# Patient Record
Sex: MALE | Race: BLACK OR AFRICAN AMERICAN | NOT HISPANIC OR LATINO | URBAN - METROPOLITAN AREA
[De-identification: names, ages, dates, MRNs, and addresses within clinical notes are randomized per-mention and may not be internally consistent; named-entity substitution may affect disease eponyms.]

---

## 2017-04-17 ENCOUNTER — EMERGENCY (EMERGENCY)
Facility: HOSPITAL | Age: 42
LOS: 1 days | Discharge: HOME | End: 2017-04-17
Attending: EMERGENCY MEDICINE | Admitting: EMERGENCY MEDICINE
Payer: COMMERCIAL

## 2017-04-17 VITALS
RESPIRATION RATE: 16 BRPM | SYSTOLIC BLOOD PRESSURE: 130 MMHG | TEMPERATURE: 99 F | DIASTOLIC BLOOD PRESSURE: 80 MMHG | OXYGEN SATURATION: 99 % | HEART RATE: 97 BPM

## 2017-04-17 VITALS
DIASTOLIC BLOOD PRESSURE: 90 MMHG | HEART RATE: 83 BPM | RESPIRATION RATE: 16 BRPM | SYSTOLIC BLOOD PRESSURE: 145 MMHG | OXYGEN SATURATION: 98 % | TEMPERATURE: 99 F

## 2017-04-17 DIAGNOSIS — R10.32 LEFT LOWER QUADRANT PAIN: ICD-10-CM

## 2017-04-17 DIAGNOSIS — Z79.899 OTHER LONG TERM (CURRENT) DRUG THERAPY: ICD-10-CM

## 2017-04-17 DIAGNOSIS — F17.210 NICOTINE DEPENDENCE, CIGARETTES, UNCOMPLICATED: ICD-10-CM

## 2017-04-17 LAB
ALBUMIN SERPL ELPH-MCNC: 4.5 G/DL — SIGNIFICANT CHANGE UP (ref 3.3–5)
ALP SERPL-CCNC: 70 U/L — SIGNIFICANT CHANGE UP (ref 40–120)
ALT FLD-CCNC: 15 U/L RC — SIGNIFICANT CHANGE UP (ref 10–45)
ANION GAP SERPL CALC-SCNC: 17 MMOL/L — SIGNIFICANT CHANGE UP (ref 5–17)
APPEARANCE UR: CLEAR — SIGNIFICANT CHANGE UP
AST SERPL-CCNC: 20 U/L — SIGNIFICANT CHANGE UP (ref 10–40)
BASOPHILS # BLD AUTO: 0.1 K/UL — SIGNIFICANT CHANGE UP (ref 0–0.2)
BILIRUB SERPL-MCNC: 0.3 MG/DL — SIGNIFICANT CHANGE UP (ref 0.2–1.2)
BILIRUB UR-MCNC: NEGATIVE — SIGNIFICANT CHANGE UP
BUN SERPL-MCNC: 15 MG/DL — SIGNIFICANT CHANGE UP (ref 7–23)
CALCIUM SERPL-MCNC: 9.7 MG/DL — SIGNIFICANT CHANGE UP (ref 8.4–10.5)
CHLORIDE SERPL-SCNC: 102 MMOL/L — SIGNIFICANT CHANGE UP (ref 96–108)
CO2 SERPL-SCNC: 23 MMOL/L — SIGNIFICANT CHANGE UP (ref 22–31)
COLOR SPEC: SIGNIFICANT CHANGE UP
CREAT SERPL-MCNC: 1.3 MG/DL — SIGNIFICANT CHANGE UP (ref 0.5–1.3)
DIFF PNL FLD: NEGATIVE — SIGNIFICANT CHANGE UP
EOSINOPHIL # BLD AUTO: 0.2 K/UL — SIGNIFICANT CHANGE UP (ref 0–0.5)
EOSINOPHIL NFR BLD AUTO: 1 % — SIGNIFICANT CHANGE UP (ref 0–6)
GLUCOSE SERPL-MCNC: 110 MG/DL — HIGH (ref 70–99)
GLUCOSE UR QL: NEGATIVE — SIGNIFICANT CHANGE UP
HCT VFR BLD CALC: 45.8 % — SIGNIFICANT CHANGE UP (ref 39–50)
HGB BLD-MCNC: 15.9 G/DL — SIGNIFICANT CHANGE UP (ref 13–17)
KETONES UR-MCNC: NEGATIVE — SIGNIFICANT CHANGE UP
LEUKOCYTE ESTERASE UR-ACNC: NEGATIVE — SIGNIFICANT CHANGE UP
LYMPHOCYTES # BLD AUTO: 2.1 K/UL — SIGNIFICANT CHANGE UP (ref 1–3.3)
LYMPHOCYTES # BLD AUTO: 29 % — SIGNIFICANT CHANGE UP (ref 13–44)
MCHC RBC-ENTMCNC: 30.4 PG — SIGNIFICANT CHANGE UP (ref 27–34)
MCHC RBC-ENTMCNC: 34.7 GM/DL — SIGNIFICANT CHANGE UP (ref 32–36)
MCV RBC AUTO: 87.6 FL — SIGNIFICANT CHANGE UP (ref 80–100)
MONOCYTES # BLD AUTO: 0.8 K/UL — SIGNIFICANT CHANGE UP (ref 0–0.9)
MONOCYTES NFR BLD AUTO: 4 % — SIGNIFICANT CHANGE UP (ref 2–14)
NEUTROPHILS # BLD AUTO: 6.8 K/UL — SIGNIFICANT CHANGE UP (ref 1.8–7.4)
NEUTROPHILS NFR BLD AUTO: 66 % — SIGNIFICANT CHANGE UP (ref 43–77)
NITRITE UR-MCNC: NEGATIVE — SIGNIFICANT CHANGE UP
PH UR: 6.5 — SIGNIFICANT CHANGE UP (ref 4.8–8)
PLATELET # BLD AUTO: 211 K/UL — SIGNIFICANT CHANGE UP (ref 150–400)
POTASSIUM SERPL-MCNC: 3.9 MMOL/L — SIGNIFICANT CHANGE UP (ref 3.5–5.3)
POTASSIUM SERPL-SCNC: 3.9 MMOL/L — SIGNIFICANT CHANGE UP (ref 3.5–5.3)
PROT SERPL-MCNC: 7.6 G/DL — SIGNIFICANT CHANGE UP (ref 6–8.3)
PROT UR-MCNC: NEGATIVE — SIGNIFICANT CHANGE UP
RBC # BLD: 5.23 M/UL — SIGNIFICANT CHANGE UP (ref 4.2–5.8)
RBC # FLD: 13.1 % — SIGNIFICANT CHANGE UP (ref 10.3–14.5)
SODIUM SERPL-SCNC: 142 MMOL/L — SIGNIFICANT CHANGE UP (ref 135–145)
SP GR SPEC: 1.01 — SIGNIFICANT CHANGE UP (ref 1.01–1.02)
UROBILINOGEN FLD QL: NEGATIVE — SIGNIFICANT CHANGE UP
WBC # BLD: 9.9 K/UL — SIGNIFICANT CHANGE UP (ref 3.8–10.5)
WBC # FLD AUTO: 9.9 K/UL — SIGNIFICANT CHANGE UP (ref 3.8–10.5)

## 2017-04-17 PROCEDURE — 99284 EMERGENCY DEPT VISIT MOD MDM: CPT | Mod: 25

## 2017-04-17 PROCEDURE — 96374 THER/PROPH/DIAG INJ IV PUSH: CPT

## 2017-04-17 PROCEDURE — 85027 COMPLETE CBC AUTOMATED: CPT

## 2017-04-17 PROCEDURE — 99284 EMERGENCY DEPT VISIT MOD MDM: CPT

## 2017-04-17 PROCEDURE — 81003 URINALYSIS AUTO W/O SCOPE: CPT

## 2017-04-17 PROCEDURE — 80053 COMPREHEN METABOLIC PANEL: CPT

## 2017-04-17 RX ORDER — ACETAMINOPHEN 500 MG
975 TABLET ORAL ONCE
Qty: 0 | Refills: 0 | Status: DISCONTINUED | OUTPATIENT
Start: 2017-04-17 | End: 2017-04-17

## 2017-04-17 RX ORDER — KETOROLAC TROMETHAMINE 30 MG/ML
30 SYRINGE (ML) INJECTION ONCE
Qty: 0 | Refills: 0 | Status: DISCONTINUED | OUTPATIENT
Start: 2017-04-17 | End: 2017-04-17

## 2017-04-17 RX ADMIN — Medication 30 MILLIGRAM(S): at 20:43

## 2017-04-17 NOTE — ED PROVIDER NOTE - CARE PLAN
Principal Discharge DX:	Left lower quadrant pain  Instructions for follow-up, activity and diet:	Take Tylenol and Motrin as needed for your pain.  Use ice or heat packs as needed.  Please see a primary care doctor in 2-3 days

## 2017-04-17 NOTE — ED PROVIDER NOTE - PLAN OF CARE
Take Tylenol and Motrin as needed for your pain.  Use ice or heat packs as needed.  Please see a primary care doctor in 2-3 days

## 2017-04-17 NOTE — ED PROVIDER NOTE - OBJECTIVE STATEMENT
40 y/o man with no significant PMH presents complaining of one day of severe lower abdominal pain.  Pain had sudden onset yesterday, is LLQ/midline, does not radiate, and is 10/10 in severity.  Pain is made worse with walking. 42 y/o man with no significant PMH presents complaining of one day of severe lower abdominal pain.  Pain had sudden onset yesterday, is LLQ/midline, does not radiate, and is 10/10 in severity.  Pain is made worse with walking and is not affected by valsalva.  Patient has not attempted analgesia.  Patient denies fevers, chills, CP, SOB, nausea, vomiting, change in bowel habits, dysuria, hematuria, and urinary symptoms.  Patient has not had any recent changes in physical activity.

## 2017-04-17 NOTE — ED PROVIDER NOTE - MEDICAL DECISION MAKING DETAILS
40 y/o man with no significant medical history, vital signs stable, no significant physical exam findings.  UA neg.  Pain likely musculoskeletal.

## 2017-04-17 NOTE — ED PROVIDER NOTE - ATTENDING CONTRIBUTION TO CARE
40 yo male with small (2-3cm diameter) focus of pain on the abdominal wall ~6cm superior and 6 cm lateral from the pubic symphysis.  No hernia appreciated prone, standing, or coughing.  No other abdominal TTP.  Normal bowel sounds.  Labs unremarkable.  No dysuria or flank pain.  Unsure of etiology, possibly MSK.  Very much doubt emergent intraabdominal processes, and I firmly believe further workup/testing for these etiologies will expose patient to more risk than benefit.

## 2017-04-17 NOTE — ED ADULT NURSE NOTE - OBJECTIVE STATEMENT
39 y/o M ambulatory to ED with steady gait accompanied by significant other c/o L suprapubic pain starting yesterday.  A&Ox4.  No dizziness.  No SOB.  Lungs clear and equal to auscultation.  Easy work of breathing.  Pt reports "chest cold" for 3 weeks.  No chest pain.  No numbness or tingling.  No edema.  No n/v/d.  Abd soft round tender L suprapubic.  Denies urinary burning, urgency or frequency. VSS.  Will continue to monitor.  Safety maintained.

## 2017-04-17 NOTE — ED ADULT NURSE NOTE - DISCHARGE TEACHING
Carla BAXTER, pt verbalizes understanding to f/u with PCP and return to ED for any worsening symptoms.

## 2017-05-26 ENCOUNTER — EMERGENCY (EMERGENCY)
Facility: HOSPITAL | Age: 42
LOS: 1 days | Discharge: HOME | End: 2017-05-26
Attending: EMERGENCY MEDICINE | Admitting: EMERGENCY MEDICINE
Payer: COMMERCIAL

## 2017-05-26 VITALS
TEMPERATURE: 99 F | DIASTOLIC BLOOD PRESSURE: 86 MMHG | HEART RATE: 79 BPM | RESPIRATION RATE: 18 BRPM | HEIGHT: 72 IN | SYSTOLIC BLOOD PRESSURE: 128 MMHG | OXYGEN SATURATION: 98 %

## 2017-05-26 DIAGNOSIS — M25.512 PAIN IN LEFT SHOULDER: ICD-10-CM

## 2017-05-26 DIAGNOSIS — F17.210 NICOTINE DEPENDENCE, CIGARETTES, UNCOMPLICATED: ICD-10-CM

## 2017-05-26 PROCEDURE — 93005 ELECTROCARDIOGRAM TRACING: CPT

## 2017-05-26 PROCEDURE — 73030 X-RAY EXAM OF SHOULDER: CPT

## 2017-05-26 PROCEDURE — 93010 ELECTROCARDIOGRAM REPORT: CPT

## 2017-05-26 PROCEDURE — 99284 EMERGENCY DEPT VISIT MOD MDM: CPT | Mod: 25

## 2017-05-26 PROCEDURE — 99283 EMERGENCY DEPT VISIT LOW MDM: CPT | Mod: 25

## 2017-05-26 RX ORDER — ACETAMINOPHEN 500 MG
650 TABLET ORAL ONCE
Qty: 0 | Refills: 0 | Status: COMPLETED | OUTPATIENT
Start: 2017-05-26 | End: 2017-05-26

## 2017-05-26 RX ORDER — IBUPROFEN 200 MG
600 TABLET ORAL ONCE
Qty: 0 | Refills: 0 | Status: COMPLETED | OUTPATIENT
Start: 2017-05-26 | End: 2017-05-26

## 2017-05-26 RX ADMIN — Medication 650 MILLIGRAM(S): at 23:56

## 2017-05-26 RX ADMIN — Medication 600 MILLIGRAM(S): at 23:56

## 2017-05-26 NOTE — ED PROVIDER NOTE - NS ED MD SCRIBE ATTENDING SCRIBE SECTIONS
VITAL SIGNS( Pullset)/HIV/PROGRESS NOTE/DISPOSITION/RESULTS/PAST MEDICAL/SURGICAL/SOCIAL HISTORY/PHYSICAL EXAM/HISTORY OF PRESENT ILLNESS/REVIEW OF SYSTEMS

## 2017-05-26 NOTE — ED PROVIDER NOTE - MEDICAL DECISION MAKING DETAILS
Exam most consistent with a shoulder injury. Possible rotator cuff or tendinitis. No evidence of a neurovascular compromise. Will obtain plain films and pain control. Will refer patient to sports medicine for outpatient follow up. Patient requesting EKG, but history is not consistent with ACS. Do not believe lab work or further ED workup is indicated.

## 2017-05-26 NOTE — ED ADULT NURSE NOTE - OBJECTIVE STATEMENT
41 year old male presenting to the ED complaining of left sided shoulder pain. As per patient he took some OTC medications this morning and still has pain. Pt denies chest pain, pain radiating to nec, back, jaw or shoulder. Pt ambulates independently, some limited ROM, pt able to move arm, denies numbness or tingling below site of injury.  VSS, afebrile, bed in low position, pt in position of comfort.

## 2017-05-26 NOTE — ED PROVIDER NOTE - MUSCULOSKELETAL, MLM
Tenderness to palpation along left posterior shoulder but no palpable muscle spasm. Internal and external rotation exacerbate the pain. Strong radial pulses.

## 2017-05-26 NOTE — ED PROVIDER NOTE - OBJECTIVE STATEMENT
41 year old male patient with no reported pmhx presents to the ED c/o constant sharp pain to the left shoulder x4 days worsening yesterday. The pain is aggravated by movement and has difficulty raising his arm above his shoulder. Patient said it started in his left triceps and it has since moved to his shoulder. States it just started while he was walking. Denies trauma and lifting heavy recently. Smokes half a pack a day.   Denies chest pain and shortness of breath. 41 year old male patient with no reported pmhx presents to the ED c/o constant sharp pain to the left shoulder x4 days worsening yesterday. The pain is aggravated by movement of the arm and has difficulty raising his arm above his shoulder. Patient said it started in his left triceps and it has since moved to his shoulder. States it just started while he was walking. Denies trauma and lifting heavy recently. Smokes half a pack a day.   Denies chest pain and shortness of breath.

## 2017-05-27 VITALS
TEMPERATURE: 98 F | OXYGEN SATURATION: 97 % | RESPIRATION RATE: 16 BRPM | SYSTOLIC BLOOD PRESSURE: 120 MMHG | HEART RATE: 63 BPM | DIASTOLIC BLOOD PRESSURE: 81 MMHG

## 2017-05-27 PROCEDURE — 73030 X-RAY EXAM OF SHOULDER: CPT | Mod: 26,LT

## 2017-06-15 PROBLEM — Z00.00 ENCOUNTER FOR PREVENTIVE HEALTH EXAMINATION: Status: ACTIVE | Noted: 2017-06-15

## 2017-06-15 RX ORDER — CETIRIZINE HYDROCHLORIDE 10 MG/1
1 TABLET ORAL
Qty: 0 | Refills: 0 | COMMUNITY

## 2018-05-10 ENCOUNTER — APPOINTMENT (OUTPATIENT)
Dept: OPHTHALMOLOGY | Facility: CLINIC | Age: 43
End: 2018-05-10

## 2018-05-10 ENCOUNTER — EMERGENCY (EMERGENCY)
Facility: HOSPITAL | Age: 43
LOS: 1 days | Discharge: HOME | End: 2018-05-10
Attending: EMERGENCY MEDICINE | Admitting: EMERGENCY MEDICINE
Payer: COMMERCIAL

## 2018-05-10 VITALS
SYSTOLIC BLOOD PRESSURE: 130 MMHG | HEART RATE: 89 BPM | DIASTOLIC BLOOD PRESSURE: 73 MMHG | OXYGEN SATURATION: 100 % | RESPIRATION RATE: 16 BRPM

## 2018-05-10 VITALS
HEART RATE: 91 BPM | SYSTOLIC BLOOD PRESSURE: 155 MMHG | WEIGHT: 251.11 LBS | RESPIRATION RATE: 18 BRPM | TEMPERATURE: 99 F | OXYGEN SATURATION: 100 % | DIASTOLIC BLOOD PRESSURE: 83 MMHG | HEIGHT: 72 IN

## 2018-05-10 DIAGNOSIS — Z98.52 VASECTOMY STATUS: Chronic | ICD-10-CM

## 2018-05-10 PROCEDURE — 99283 EMERGENCY DEPT VISIT LOW MDM: CPT

## 2018-05-10 PROCEDURE — 99282 EMERGENCY DEPT VISIT SF MDM: CPT | Mod: 25
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
## 2018-05-10 NOTE — ED PROVIDER NOTE - CARE PLAN
Principal Discharge DX:	Left eye pain Principal Discharge DX:	Left eye pain  Assessment and plan of treatment:	1) Please follow-up with your primary care doctor in the next 1-2 days.  Please call tomorrow for an appointment.  If you cannot follow-up with your primary care doctor please return to the ED for any urgent issues.  2) You were given a copy of the tests performed today.  Please bring the results with you and review them with your primary care doctor.  3) If you have any worsening of symptoms or any other concerns please return to the ED immediately. This includes chest pain, shortness of breath, fever/chills, increased pain, or any other concerns.  4) Please continue taking your home medications as directed.    You will be seen by Dr. Lyman 10am at the [address]
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
## 2018-05-10 NOTE — ED PROVIDER NOTE - EYE, LEFT
no tenderness or periorbital swelling/CONJUNCTIVA ERYTHEMA/pupils equal, round, and reactive to light/clear

## 2018-05-10 NOTE — ED PROVIDER NOTE - PLAN OF CARE
1) Please follow-up with your primary care doctor in the next 1-2 days.  Please call tomorrow for an appointment.  If you cannot follow-up with your primary care doctor please return to the ED for any urgent issues.  2) You were given a copy of the tests performed today.  Please bring the results with you and review them with your primary care doctor.  3) If you have any worsening of symptoms or any other concerns please return to the ED immediately. This includes chest pain, shortness of breath, fever/chills, increased pain, or any other concerns.  4) Please continue taking your home medications as directed.    You will be seen by Dr. Lmyan 10am at the [address]
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
## 2018-06-22 ENCOUNTER — EMERGENCY (EMERGENCY)
Facility: HOSPITAL | Age: 43
LOS: 1 days | Discharge: HOME | End: 2018-06-22
Attending: EMERGENCY MEDICINE | Admitting: EMERGENCY MEDICINE
Payer: COMMERCIAL

## 2018-06-22 VITALS
RESPIRATION RATE: 17 BRPM | OXYGEN SATURATION: 100 % | TEMPERATURE: 98 F | HEART RATE: 85 BPM | SYSTOLIC BLOOD PRESSURE: 134 MMHG | DIASTOLIC BLOOD PRESSURE: 74 MMHG

## 2018-06-22 VITALS
SYSTOLIC BLOOD PRESSURE: 141 MMHG | OXYGEN SATURATION: 97 % | HEART RATE: 97 BPM | TEMPERATURE: 98 F | WEIGHT: 255.07 LBS | RESPIRATION RATE: 18 BRPM | HEIGHT: 72 IN | DIASTOLIC BLOOD PRESSURE: 90 MMHG

## 2018-06-22 DIAGNOSIS — Z98.52 VASECTOMY STATUS: Chronic | ICD-10-CM

## 2018-06-22 LAB
ALBUMIN SERPL ELPH-MCNC: 4.4 G/DL — SIGNIFICANT CHANGE UP (ref 3.3–5)
ALP SERPL-CCNC: 64 U/L — SIGNIFICANT CHANGE UP (ref 40–120)
ALT FLD-CCNC: 14 U/L — SIGNIFICANT CHANGE UP (ref 10–45)
ANION GAP SERPL CALC-SCNC: 12 MMOL/L — SIGNIFICANT CHANGE UP (ref 5–17)
APPEARANCE UR: CLEAR — SIGNIFICANT CHANGE UP
AST SERPL-CCNC: 25 U/L — SIGNIFICANT CHANGE UP (ref 10–40)
BASOPHILS # BLD AUTO: 0 K/UL — SIGNIFICANT CHANGE UP (ref 0–0.2)
BASOPHILS NFR BLD AUTO: 0.3 % — SIGNIFICANT CHANGE UP (ref 0–2)
BILIRUB SERPL-MCNC: 0.8 MG/DL — SIGNIFICANT CHANGE UP (ref 0.2–1.2)
BILIRUB UR-MCNC: NEGATIVE — SIGNIFICANT CHANGE UP
BUN SERPL-MCNC: 13 MG/DL — SIGNIFICANT CHANGE UP (ref 7–23)
CALCIUM SERPL-MCNC: 9.8 MG/DL — SIGNIFICANT CHANGE UP (ref 8.4–10.5)
CHLORIDE SERPL-SCNC: 103 MMOL/L — SIGNIFICANT CHANGE UP (ref 96–108)
CO2 SERPL-SCNC: 26 MMOL/L — SIGNIFICANT CHANGE UP (ref 22–31)
COLOR SPEC: YELLOW — SIGNIFICANT CHANGE UP
CREAT SERPL-MCNC: 1.42 MG/DL — HIGH (ref 0.5–1.3)
D DIMER BLD IA.RAPID-MCNC: 314 NG/ML DDU — HIGH
DIFF PNL FLD: NEGATIVE — SIGNIFICANT CHANGE UP
EOSINOPHIL # BLD AUTO: 0.1 K/UL — SIGNIFICANT CHANGE UP (ref 0–0.5)
EOSINOPHIL NFR BLD AUTO: 0.9 % — SIGNIFICANT CHANGE UP (ref 0–6)
GLUCOSE SERPL-MCNC: 166 MG/DL — HIGH (ref 70–99)
GLUCOSE UR QL: NEGATIVE — SIGNIFICANT CHANGE UP
HAV IGM SER-ACNC: SIGNIFICANT CHANGE UP
HBV CORE IGM SER-ACNC: SIGNIFICANT CHANGE UP
HBV SURFACE AB SER-ACNC: REACTIVE
HBV SURFACE AG SER-ACNC: SIGNIFICANT CHANGE UP
HBV SURFACE AG SER-ACNC: SIGNIFICANT CHANGE UP
HCT VFR BLD CALC: 47.8 % — SIGNIFICANT CHANGE UP (ref 39–50)
HCV AB S/CO SERPL IA: 0.08 S/CO — SIGNIFICANT CHANGE UP
HCV AB SERPL-IMP: SIGNIFICANT CHANGE UP
HGB BLD-MCNC: 16 G/DL — SIGNIFICANT CHANGE UP (ref 13–17)
HIV 1 & 2 AB SERPL IA.RAPID: SIGNIFICANT CHANGE UP
KETONES UR-MCNC: NEGATIVE — SIGNIFICANT CHANGE UP
LEUKOCYTE ESTERASE UR-ACNC: NEGATIVE — SIGNIFICANT CHANGE UP
LYMPHOCYTES # BLD AUTO: 1.5 K/UL — SIGNIFICANT CHANGE UP (ref 1–3.3)
LYMPHOCYTES # BLD AUTO: 16 % — SIGNIFICANT CHANGE UP (ref 13–44)
MCHC RBC-ENTMCNC: 29.5 PG — SIGNIFICANT CHANGE UP (ref 27–34)
MCHC RBC-ENTMCNC: 33.5 GM/DL — SIGNIFICANT CHANGE UP (ref 32–36)
MCV RBC AUTO: 88 FL — SIGNIFICANT CHANGE UP (ref 80–100)
MONOCYTES # BLD AUTO: 0.6 K/UL — SIGNIFICANT CHANGE UP (ref 0–0.9)
MONOCYTES NFR BLD AUTO: 6.3 % — SIGNIFICANT CHANGE UP (ref 2–14)
NEUTROPHILS # BLD AUTO: 7.4 K/UL — SIGNIFICANT CHANGE UP (ref 1.8–7.4)
NEUTROPHILS NFR BLD AUTO: 76.5 % — SIGNIFICANT CHANGE UP (ref 43–77)
NITRITE UR-MCNC: NEGATIVE — SIGNIFICANT CHANGE UP
NT-PROBNP SERPL-SCNC: 7 PG/ML — SIGNIFICANT CHANGE UP (ref 0–300)
PH UR: 6.5 — SIGNIFICANT CHANGE UP (ref 5–8)
PLATELET # BLD AUTO: 225 K/UL — SIGNIFICANT CHANGE UP (ref 150–400)
POTASSIUM SERPL-MCNC: 4.4 MMOL/L — SIGNIFICANT CHANGE UP (ref 3.5–5.3)
POTASSIUM SERPL-SCNC: 4.4 MMOL/L — SIGNIFICANT CHANGE UP (ref 3.5–5.3)
PROT SERPL-MCNC: 7.7 G/DL — SIGNIFICANT CHANGE UP (ref 6–8.3)
PROT UR-MCNC: NEGATIVE — SIGNIFICANT CHANGE UP
RBC # BLD: 5.43 M/UL — SIGNIFICANT CHANGE UP (ref 4.2–5.8)
RBC # FLD: 12.5 % — SIGNIFICANT CHANGE UP (ref 10.3–14.5)
SODIUM SERPL-SCNC: 141 MMOL/L — SIGNIFICANT CHANGE UP (ref 135–145)
SP GR SPEC: 1.01 — SIGNIFICANT CHANGE UP (ref 1.01–1.02)
TROPONIN T, HIGH SENSITIVITY RESULT: <6 NG/L — SIGNIFICANT CHANGE UP (ref 0–51)
UROBILINOGEN FLD QL: NEGATIVE — SIGNIFICANT CHANGE UP
WBC # BLD: 9.6 K/UL — SIGNIFICANT CHANGE UP (ref 3.8–10.5)
WBC # FLD AUTO: 9.6 K/UL — SIGNIFICANT CHANGE UP (ref 3.8–10.5)
WBC UR QL: SIGNIFICANT CHANGE UP /HPF (ref 0–5)

## 2018-06-22 PROCEDURE — 80074 ACUTE HEPATITIS PANEL: CPT

## 2018-06-22 PROCEDURE — 86706 HEP B SURFACE ANTIBODY: CPT

## 2018-06-22 PROCEDURE — 85379 FIBRIN DEGRADATION QUANT: CPT

## 2018-06-22 PROCEDURE — 86703 HIV-1/HIV-2 1 RESULT ANTBDY: CPT

## 2018-06-22 PROCEDURE — 87591 N.GONORRHOEAE DNA AMP PROB: CPT

## 2018-06-22 PROCEDURE — 87491 CHLMYD TRACH DNA AMP PROBE: CPT

## 2018-06-22 PROCEDURE — 93005 ELECTROCARDIOGRAM TRACING: CPT

## 2018-06-22 PROCEDURE — 80053 COMPREHEN METABOLIC PANEL: CPT

## 2018-06-22 PROCEDURE — 71275 CT ANGIOGRAPHY CHEST: CPT | Mod: 26

## 2018-06-22 PROCEDURE — 87086 URINE CULTURE/COLONY COUNT: CPT

## 2018-06-22 PROCEDURE — 81001 URINALYSIS AUTO W/SCOPE: CPT

## 2018-06-22 PROCEDURE — 99284 EMERGENCY DEPT VISIT MOD MDM: CPT | Mod: 25

## 2018-06-22 PROCEDURE — 93010 ELECTROCARDIOGRAM REPORT: CPT

## 2018-06-22 PROCEDURE — 83880 ASSAY OF NATRIURETIC PEPTIDE: CPT

## 2018-06-22 PROCEDURE — 71046 X-RAY EXAM CHEST 2 VIEWS: CPT | Mod: 26

## 2018-06-22 PROCEDURE — 71275 CT ANGIOGRAPHY CHEST: CPT

## 2018-06-22 PROCEDURE — 84484 ASSAY OF TROPONIN QUANT: CPT

## 2018-06-22 PROCEDURE — 87340 HEPATITIS B SURFACE AG IA: CPT

## 2018-06-22 PROCEDURE — 85027 COMPLETE CBC AUTOMATED: CPT

## 2018-06-22 PROCEDURE — 99285 EMERGENCY DEPT VISIT HI MDM: CPT | Mod: 25

## 2018-06-22 PROCEDURE — 71046 X-RAY EXAM CHEST 2 VIEWS: CPT

## 2018-06-22 NOTE — ED ADULT NURSE NOTE - OBJECTIVE STATEMENT
43 yo male pt with history of hld, presents to ed complaining of pre-syncopal episode last night after getting off a plane from [facility] (6 hour flight.) as per pt "i  arrived from [facility] last night and I became dizzy and started having cold sweats after getting off the plane. I became very sweaty and I thought that I was going to pass out. everything resolved, but I wanted to be checked out." pt endorses sob last night, that has since resolved. pt denies chest pain or palpitations/abd pain/dizziness/weakness/sob/numbness/tingling/buitrago/pleuritic chest pain/trauma/loc at this time. pt denies etoh or drug use. daily smoker. pt denies family history of pe or dvt. breath sounds clear and equal bilaterally. abd nontender and nondistended. skin warm, intact. pt diaphoretic. pt placed on cardiac monitor NSR.

## 2018-06-22 NOTE — ED PROVIDER NOTE - ATTENDING CONTRIBUTION TO CARE
------------ATTENDING NOTE------------   pt coming to ED c/o urinary retention, describing fullness of bladder and mild dull constant pain and only small amts of urinary stream/emptying, currently tx for UTI, nml VS, soft benign abd, no fevers, UA/UCx sent, tellez placed, dw Urologist, ED Sign Out pending CBC/CMP and likely d/c w/ close outpt fu (Urologist will see today).  - Vinny Corbin MD   ---------------------------------------------- ------------ATTENDING NOTE------------   pt c/o feeling lightheaded, near passing out, sweating, generalized fatigue while on flight, 4 hrs prior to ED arrival and constant, no palpitations, no fevers, low risk for ACS given EKG/Trop, elevated dimer, ED Sign Out 7AM ([facility]) pending CTA Chest, labs, and reassessments for dispo.  ----------------------------------------------

## 2018-06-22 NOTE — ED ADULT NURSE NOTE - CHPI ED SYMPTOMS NEG
no shortness of breath/no syncope/no chest pain/no cough/no back pain/no dizziness/no diaphoresis/no nausea/no chills/no vomiting/no fever

## 2018-06-22 NOTE — ED ADULT TRIAGE NOTE - CHIEF COMPLAINT QUOTE
arrived from [facility] last night wants to be checked for dizzness/cold sweat, states wants to be checked? arrived from [facility] last night wants to be checked for dizzness/cold sweat, states wants to be checked? but symptoms resolved today

## 2018-06-22 NOTE — ED PROVIDER NOTE - OBJECTIVE STATEMENT
41 yo male with PMH of HLD, presents to the ED for pre-syncopal episode tonight around midnight after getting off the plane from [facility] (6 hour flight). Pt states he felt suddenly diaphoretic, lightheaded like he was going to pass out, +SOB. Denies CP, ABREU, pleuritic chest pain, syncope, trauma. Denies EtOH or drug use. +daily smoker. No personal or family history of PE or DVT. Pt also requesting STD testing today.

## 2018-06-22 NOTE — ED ADULT NURSE REASSESSMENT NOTE - NS ED NURSE REASSESS COMMENT FT1
patient resting comfortably in bed in no acute distress. patient denies pain at this time. NSR on monitor. patient pending blood work, urine and CTA results. patient aware.

## 2018-06-22 NOTE — ED PROVIDER NOTE - CARE PLAN
Principal Discharge DX:	Urinary retention Principal Discharge DX:	Near syncope  Secondary Diagnosis:	Dyspnea Principal Discharge DX:	Near syncope  Assessment and plan of treatment:	1. Return to ED for worsening, progressive or any other concerning symptoms   2. Follow up with your primary care doctor in 2-3days  Secondary Diagnosis:	Dyspnea

## 2018-06-22 NOTE — ED ADULT NURSE NOTE - CHIEF COMPLAINT QUOTE
arrived from [facility] last night wants to be checked for dizzness/cold sweat, states wants to be checked? but symptoms resolved today

## 2018-06-23 LAB
C TRACH RRNA SPEC QL NAA+PROBE: SIGNIFICANT CHANGE UP
CULTURE RESULTS: NO GROWTH — SIGNIFICANT CHANGE UP
N GONORRHOEA RRNA SPEC QL NAA+PROBE: SIGNIFICANT CHANGE UP
SPECIMEN SOURCE: SIGNIFICANT CHANGE UP
SPECIMEN SOURCE: SIGNIFICANT CHANGE UP

## 2019-08-23 NOTE — ED PROVIDER NOTE - CROS ED MUSC ALL NEG
SUBJECTIVE:  No acute events overnight. Continues to have dyspnea with activity, however no supplemental O2 needed during therapy. No dyspnea at rest. Nurse and patient state rash on buttocks improving. Nasal congestion improving.     ROS: + CASTILLO.  Denies lightheadedness, headache, fevers/ chills, dysuria, constipation, fever/ chills.     -------------------------------------   OBJECTIVE  Vital Signs Last 24 Hrs  T(C): 37.1 (22 Aug 2019 08:27), Max: 37.1 (22 Aug 2019 08:27)  T(F): 98.7 (22 Aug 2019 08:27), Max: 98.7 (22 Aug 2019 08:27)  HR: 66 (22 Aug 2019 08:27) (63 - 71)  BP: 109/74 (22 Aug 2019 08:27) (109/74 - 148/78)  BP(mean): --  RR: 14 (22 Aug 2019 08:27) (14 - 15)  SpO2: 95% (22 Aug 2019 08:27) (94% - 95%)    - 137.4kg on admission bed weights.     131.9kg standing 8/15     132.2kg standing 8/16    131.5kg standing 8/19 8/21:131.7  8/22:131.8    129 Kg standing 8/23    PHYSICAL EXAM  General: NAD, comfortable.  Cardio: RRR, S1/S2+  Resp: R side crackles at base otherwise CTAB.   Abdomen: soft, NTND, obese  Neuro:      Cognitive: AAO x3,      Communication: no dysarthria or aphasia     Strength: Overall 5/5 bilaterally.  Extrem: no edema, no calf tenderness   Skin: Sternal incision well healed open to air. Fungal maculopapular rash and satellite lesions b/l posterior thighs and periumbilical.     Functional Exam:  Transfers: min assist  Gait: RW with CG/close supervision  -------------------------------------     LABS:  PT/INR - ( 22 Aug 2019 05:35 )   PT: 27.9 sec;   INR: 2.42 ratio    PT/INR - ( 18 Aug 2019 05:15 )   PT: 28.6 sec;   INR: 2.48 ratio    PT/INR - ( 16 Aug 2019 08:15 )   PT: 25.0 sec;   INR: 2.18 ratio                            10.0   9.69  )-----------( 426      ( 22 Aug 2019 05:35 )             32.2     08-22    140  |  106  |  21  ----------------------------<  119<H>  4.1   |  27  |  1.71<H>    Ca    9.0      22 Aug 2019 05:35    TPro  7.1  /  Alb  2.6<L>  /  TBili  0.3  /  DBili  x   /  AST  12  /  ALT  10  /  AlkPhos  73  08-22    -------------------------------------   MEDICATIONS  (STANDING):  amiodarone    Tablet 200 milliGRAM(s) Oral daily  aspirin  chewable 81 milliGRAM(s) Oral daily  atorvastatin 40 milliGRAM(s) Oral at bedtime  dextrose 5%. 1000 milliLiter(s) (50 mL/Hr) IV Continuous <Continuous>  dextrose 50% Injectable 12.5 Gram(s) IV Push once  dextrose 50% Injectable 25 Gram(s) IV Push once  dextrose 50% Injectable 25 Gram(s) IV Push once  docusate sodium 100 milliGRAM(s) Oral three times a day  melatonin 6 milliGRAM(s) Oral at bedtime  metoprolol tartrate 50 milliGRAM(s) Oral every 8 hours  nystatin/triamcinolone Cream 1 Application(s) Topical two times a day  pantoprazole    Tablet 40 milliGRAM(s) Oral before breakfast  psyllium Powder 1 Packet(s) Oral daily  senna 2 Tablet(s) Oral at bedtime  sertraline 50 milliGRAM(s) Oral daily  tamsulosin 0.4 milliGRAM(s) Oral at bedtime  tiotropium 18 MICROgram(s) Capsule 1 Capsule(s) Inhalation daily    MEDICATIONS  (PRN):  acetaminophen   Tablet .. 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    *Gait/ADL/functional deficits secondary to Cardiac surgery: CAD s/p CABG/aortic fibroelastoma removal  - Continue comprehensive Rehab Program of PT/OT   - incision well healed. Suture in place from previous drain site.  - Weekly CXR  - ASA, statin  - follow up outpatient Dr. Alvarado     *Pre-renal MILAGRO:  BUN/Cr stable but elevated. Monitor closely  - Not currently on diuretics  - 8/22 increased Cr. Encourage fluid intake. Weights lower    *Previous CVA w/ R Weakness: Dr. Hernandez neuropsych to evaluate patient    * Buttock/thigh rash: Possibly fungal/inflammatory etiology - mycolog cream BID      *PAF - coumadin goal 2-2.5. .INR therapeutic  - Coumadin, Continue Amiodarone and Metoprolol tartrate.     *HTN - Lopressor in setting of PAF    *BPH - flomax    *COPD :  - Patient reports that he does not have a hx of COPD  - f/u with PCP upon discharge.  - continue Duoneb as needed and spiriva here  - takes Breo at home; however non-formulary.   - Flonase and claritin for nasal congestion- d/w medicine    *DM2, well controlled - Diet, HA1C on 8/6 was 6.0%    *Incidental thyroid nodule noted on Carotic US: follow up with PCP for further eval.  - Last TSH on 8/6 was 0.88    *h/o FVL with PEs: IVC filter. Coumadin as above.     *GERD - Protonix    *Mood: depression - sertraline    *Prolonged QTc :- - QTc ranges 470-low 500s on previous EKGs  - Zoloft is home medication, - repeat EKG -- 463    *Insomnia - continue melatonin    * Mild leukocytosis - improved    * Morbid obesity :- Nutritional counseling, - RD to eval and educate     Pain management: Tylenol PRN    Bowel Management:  continent.   Senna and Docusate, Metamucil    Bladder Management: BPH - continue Tamsulosin. continent    Skin: Fungal rash- d/c mycolog. clotrimazole cream     * Cognition: Per Neuropsych: patient with Mild cognitive impairment    Precautions / PROPHYLAXIS:   - Falls, Cardiac,  sternal  - Pressure injury/Skin: Turn Q2hrs while in bed, OOB to Chair, PT/OT    - DVT: on coumadin SQ , TEDs, No SCDs due to IVC filter    Dispo: anticipate d/c on 8/28 home with home care and aide assistance. Reviewed progress with team today. Continues to make progress in therapy, but limited due to poor endurance negative...

## 2020-09-13 NOTE — ED PROVIDER NOTE - HEME/LYMPH NEGATIVE STATEMENT, MLM
no anemia, no easy bruising, no jaundice, no swollen lymph nodes. Patient presents to ER complaining of nausea, fever, and  bleeding to right first toe, reports onset of symptoms Friday, patient was schedule to see podiatrist on Monday, patient also C/O constipation, resp even/unlabored, lungs CTAB.

## 2023-11-21 NOTE — ED PROVIDER NOTE - EYE EXTRAOCULAR MOVEMENT, LEFT
Detail Level: Detailed Patient Specific Counseling (Will Not Stick From Patient To Patient): Discussed fluctuant is minimal but we can attempt. Verbal informed consent was obtained today after discussion of risks and before procedure. Discussed this seems to be induration.  We will still plan to complete second tissue culture next week for mycobacteria. intact

## 2024-04-19 ENCOUNTER — APPOINTMENT (OUTPATIENT)
Age: 49
End: 2024-04-19
Payer: COMMERCIAL

## 2024-04-19 ENCOUNTER — EMERGENCY (EMERGENCY)
Facility: HOSPITAL | Age: 49
LOS: 1 days | Discharge: HOME | End: 2024-04-19
Attending: EMERGENCY MEDICINE
Payer: COMMERCIAL

## 2024-04-19 VITALS
HEART RATE: 100 BPM | SYSTOLIC BLOOD PRESSURE: 142 MMHG | BODY MASS INDEX: 23.84 KG/M2 | HEIGHT: 72 IN | OXYGEN SATURATION: 98 % | DIASTOLIC BLOOD PRESSURE: 93 MMHG | WEIGHT: 176 LBS

## 2024-04-19 VITALS
OXYGEN SATURATION: 97 % | RESPIRATION RATE: 18 BRPM | HEART RATE: 87 BPM | SYSTOLIC BLOOD PRESSURE: 158 MMHG | TEMPERATURE: 98 F | WEIGHT: 276.02 LBS | DIASTOLIC BLOOD PRESSURE: 107 MMHG

## 2024-04-19 VITALS
TEMPERATURE: 98 F | OXYGEN SATURATION: 97 % | DIASTOLIC BLOOD PRESSURE: 96 MMHG | RESPIRATION RATE: 18 BRPM | HEART RATE: 71 BPM | SYSTOLIC BLOOD PRESSURE: 137 MMHG

## 2024-04-19 DIAGNOSIS — Z83.3 FAMILY HISTORY OF DIABETES MELLITUS: ICD-10-CM

## 2024-04-19 DIAGNOSIS — F17.200 NICOTINE DEPENDENCE, UNSPECIFIED, UNCOMPLICATED: ICD-10-CM

## 2024-04-19 DIAGNOSIS — Z78.9 OTHER SPECIFIED HEALTH STATUS: ICD-10-CM

## 2024-04-19 DIAGNOSIS — G56.31 LESION OF RADIAL NERVE, RIGHT UPPER LIMB: ICD-10-CM

## 2024-04-19 DIAGNOSIS — Z09 ENCOUNTER FOR FOLLOW-UP EXAMINATION AFTER COMPLETED TREATMENT FOR CONDITIONS OTHER THAN MALIGNANT NEOPLASM: ICD-10-CM

## 2024-04-19 DIAGNOSIS — Z72.0 TOBACCO USE: ICD-10-CM

## 2024-04-19 DIAGNOSIS — M25.511 PAIN IN RIGHT SHOULDER: ICD-10-CM

## 2024-04-19 DIAGNOSIS — Z98.52 VASECTOMY STATUS: Chronic | ICD-10-CM

## 2024-04-19 DIAGNOSIS — S16.1XXA STRAIN OF MUSCLE, FASCIA AND TENDON AT NECK LEVEL, INITIAL ENCOUNTER: ICD-10-CM

## 2024-04-19 PROCEDURE — 73030 X-RAY EXAM OF SHOULDER: CPT | Mod: 26,RT

## 2024-04-19 PROCEDURE — 99204 OFFICE O/P NEW MOD 45 MIN: CPT | Mod: 25

## 2024-04-19 PROCEDURE — 64450 NJX AA&/STRD OTHER PN/BRANCH: CPT

## 2024-04-19 PROCEDURE — 73030 X-RAY EXAM OF SHOULDER: CPT

## 2024-04-19 PROCEDURE — 99284 EMERGENCY DEPT VISIT MOD MDM: CPT | Mod: 25

## 2024-04-19 PROCEDURE — 76942 ECHO GUIDE FOR BIOPSY: CPT

## 2024-04-19 PROCEDURE — 99284 EMERGENCY DEPT VISIT MOD MDM: CPT

## 2024-04-19 RX ORDER — CELECOXIB 200 MG/1
200 CAPSULE ORAL DAILY
Refills: 0 | Status: DISCONTINUED | OUTPATIENT
Start: 2024-04-19 | End: 2024-04-22

## 2024-04-19 RX ORDER — DICLOFENAC SODIUM 75 MG/1
75 TABLET, DELAYED RELEASE ORAL
Qty: 30 | Refills: 2 | Status: ACTIVE | COMMUNITY
Start: 2024-04-19 | End: 1900-01-01

## 2024-04-19 RX ORDER — ACETAMINOPHEN 500 MG
975 TABLET ORAL ONCE
Refills: 0 | Status: COMPLETED | OUTPATIENT
Start: 2024-04-19 | End: 2024-04-19

## 2024-04-19 RX ORDER — IBUPROFEN 800 MG/1
800 TABLET, FILM COATED ORAL
Refills: 0 | Status: ACTIVE | COMMUNITY

## 2024-04-19 RX ORDER — LIDOCAINE 4 G/100G
1 CREAM TOPICAL ONCE
Refills: 0 | Status: COMPLETED | OUTPATIENT
Start: 2024-04-19 | End: 2024-04-19

## 2024-04-19 RX ADMIN — Medication 975 MILLIGRAM(S): at 10:41

## 2024-04-19 RX ADMIN — LIDOCAINE 1 PATCH: 4 CREAM TOPICAL at 10:42

## 2024-04-19 RX ADMIN — CELECOXIB 200 MILLIGRAM(S): 200 CAPSULE ORAL at 11:10

## 2024-04-19 RX ADMIN — CELECOXIB 200 MILLIGRAM(S): 200 CAPSULE ORAL at 11:38

## 2024-04-19 RX ADMIN — Medication 975 MILLIGRAM(S): at 11:38

## 2024-04-19 NOTE — PHYSICAL EXAM
[de-identified] : Cervical Physical Exam   Inspection: normal   Tenderness: Moderate Location right upper trap, levator scapula, focally at the mid upper arm at the level of the spiral groove  Neck ROM  Flexion: normal  Extension: normal  Lateral bending: normal  Rotation: normal    Strength testing: Motor strength is 5/5    Neurological exam: Deep tendon reflexes are symmetrical throughout the upper extremities. Sensation is normal.   No clonus  Spurlings: Positive Hoffmans: Negative [de-identified] : XR of right shoulder Date: 4/19/2024   Views: 3 views Performed at [facility]: Yes Impression: Fracture no dislocation good alignment no osteoarthritic changes.  These images were personally reviewed with original findings documented as above.

## 2024-04-19 NOTE — ED PROVIDER NOTE - CLINICAL SUMMARY MEDICAL DECISION MAKING FREE TEXT BOX
Attending MD Harris: 48-year-old male with no significant past medical history presents with 2 weeks of intermittent right shoulder pain.  No trauma.  Describes pain as gnawing in nature.  Did not see anybody for the pain yet.  Patient taking friend's Flexeril and ibuprofen with no significant relief of pain.  On exam there is no midline neck pain.  There is some pain to the right trapezius and to the lateral aspect of the shoulder in the C5 distribution.  No tenderness to palpation at tendon insertion sites.  Differential diagnosis includes C5 or radiculopathy less likely right shoulder bursitis.  Will get x-rays treat pain and arrange for outpatient orthopedic evaluation for MRI of neck and shoulder.  Plan discussed with patient at bedside with agreement.

## 2024-04-19 NOTE — ED PROVIDER NOTE - PHYSICAL EXAMINATION
CONSTITUTIONAL: Well appearing and in no apparent distress.  ENT: Airway patent, moist mucous membranes.   EYES: Pupils equal, round and reactive to light. EOMI. Conjunctiva normal appearing.   NECK: +Point TTP over right trap to the lateral aspect of the shoulder in the C5 distribution.   CARDIAC: Normal rate, regular rhythm.  Heart sounds S1, S2.    RESPIRATORY: Breath sounds clear and equal bilaterally.   GASTROINTESTINAL: Abdomen soft, non-tender, not distended.  MUSCULOSKELETAL: Spine appears normal. Moving all extremities. 5/5 muscle strength throughout.   NEUROLOGICAL: Alert and oriented x3, non focal.

## 2024-04-19 NOTE — ED PROVIDER NOTE - ATTENDING APP SHARED VISIT CONTRIBUTION OF CARE
Attending MD Harris:   I personally have seen and examined this patient.  Physician assistant note reviewed and agree on plan of care and except where noted.  Please see my MDM for further details.

## 2024-04-19 NOTE — ED ADULT NURSE NOTE - OBJECTIVE STATEMENT
PT is a 48 year old A&OX4 male with no significant PMH who presents to the ED from home with c/o right shoulder pain. PT states his pain began 2 weeks ago and is not improving. PT has been using 800 mg Ibuprofen, muscle relaxers, heat packs, and lidocaine patches without relief. PT states the pain begins in his neck and progresses down his arm. Pain is worse when turning head to the right and relieved when turning head to the left. PT states the pain is intermittent. PT denies chest pain, SOB, N/V/D, dizziness, falls, trauma to the area. PT is resting comfortably in bed, breathing unlabored on room air, and speaking in complete sentences. Abdomen is soft, non-tender, and non-distended. Skin is warm and dry, no diaphoresis noted. No edema noted to B/L extremities. Strong strength in B/L extremities, sensation intact. PT ambulatory with steady gait. Safety and comfort maintained.

## 2024-04-19 NOTE — ED PROVIDER NOTE - PROGRESS NOTE DETAILS
Pt to follow up with ortho, ED referrals coordinator scheduled pt appt later today at 215PM. Pt very appreciative. Copy of results given. Guille Sherman PA-C

## 2024-04-19 NOTE — DISCUSSION/SUMMARY
[de-identified] : CIARAN FERRARA is a 48 year old male presenting with acute right-sided upper arm and neck pain consistent with radial neuropathy.  He states the pain came about 2 weeks ago after falling asleep on that side and has focal tenderness in the mid arm with a positive Tinel's at the spiral groove and focal tenderness.  He does however also have right-sided neck spasm pain and potential positive Spurling's test.  With referred pain from a cervical radiculopathy he would expect to have significant focal tenderness over the radial nerve at the right upper arm and given those findings discussed starting with treatment for this issue with a diagnostic and potentially therapeutic injection.  Overall recommended the followin.  Radial nerve injection/hydrodissection performed today as above with immediate postprocedure relief 2.  Discussed with patient to monitor his symptoms over the next 2 weeks and if he has significant improvement then that is confirmation this is more of a peripheral neuropathy 3.  Diclofenac 75 mg prescribed to be taken up to twice a day as needed as well 4.  He will follow-up in 2 weeks if no significant improvement will refer to physical therapy for potential radiculopathy.  Order placed today.

## 2024-04-19 NOTE — HISTORY OF PRESENT ILLNESS
[de-identified] : CIARAN FERRARA is a 48 year old male presenting with 2-week history of significant right-sided arm pain.  He states he feels most of his symptoms in the middle of the right arm and feels like he heavy aching pain that is tender to touch if you press in that area.  Does have some numbness and tingling down the arm at times.  He also states he feels some tenderness in the right side of his neck and he is unsure if this is radiating down the arm.  The symptoms do not go past the elbow.  He says he went to the emergency room where x-rays were done of his shoulder that did not reveal any pathology and he has been taking over-the-counter medications as needed for pain and using a lidocaine patch both of which were not significantly helpful.  He is here today for further evaluation.

## 2024-04-19 NOTE — ED ADULT NURSE NOTE - NSFALLUNIVINTERV_ED_ALL_ED
Bed/Stretcher in lowest position, wheels locked, appropriate side rails in place/Call bell, personal items and telephone in reach/Instruct patient to call for assistance before getting out of bed/chair/stretcher/Non-slip footwear applied when patient is off stretcher/[place] to call system/Physically safe environment - no spills, clutter or unnecessary equipment/Purposeful proactive rounding/Room/bathroom lighting operational, light cord in reach

## 2024-04-19 NOTE — ED PROVIDER NOTE - PATIENT PORTAL LINK FT
You can access the FollowMyHealth Patient Portal offered by [facility] by registering at the following website: [email or web address]. By joining [facility]’s FollowMyHealth portal, you will also be able to view your health information using other applications (apps) compatible with our system.

## 2024-04-19 NOTE — ED ADULT NURSE REASSESSMENT NOTE - NS ED NURSE REASSESS COMMENT FT1
Celebrex not available in [facility]. Spoke with pharmacist who states medication will be sent via tube to [facility]. Awaiting medication arrival.

## 2024-04-19 NOTE — ED PROVIDER NOTE - NSFOLLOWUPINSTRUCTIONS_ED_ALL_ED_FT
Please make sure to follow up with your primary care doctor within 1-2 days and with the ORTHOPEDIC specialist.  Our ED referrals coordinator will call you with appointment details to see the specialist, if you do not hear from anyone please call [phone] for additional assistance.   Bring a copy of all of your results with you to your follow up appointments.   Return to the ER as discussed if you develop any new or worsening symptoms. Please make sure to follow up with your primary care doctor within 1-2 days and with the ORTHOPEDIC specialist later today at your appointment at 2:15PM.    Return to the ER as discussed if you develop any new or worsening symptoms.

## 2024-04-19 NOTE — PROCEDURE
[de-identified] : Ultrasound Guided Injection   Indication: Ensure placement under the carpal tunnel ligament ensuring flow around the median nerve   utilizing the [employer] portable ultrasound machine, the Linear 10mm 19-4 MHz transducer, sterile ultrasound gel, ultrasound guidance with the probe in short axis to the radial nerve nerve at the spiral groove, utilizing an in-plane approach, was used for the following injection:    Injection: Radial nerve injection  Indication: Radial neuropathy   A discussion was had with the patient regarding this procedure and all questions were answered. All risks, benefits and alternatives were discussed. These included but were not limited to bleeding, infection, and allergic reaction. A timeout was performed prior to the procedure to ensure proper side.  Chlorhexidine was used to sterilize the skin overlying the mid upper arm.  A 21-gauge needle was used to inject 2cc of 0.5% Ropivacaine, 2cc 1% Lidocaine, 1cc of 4mg/mL Dexamethasone from a lateral approach. A sterile bandage was then applied. The patient tolerated the procedure well and there were no complications.

## 2024-10-01 NOTE — ED PROVIDER NOTE - NS ED ATTENDING STATEMENT MOD
Patient ID: Earline Villagran is a 67 y.o. female who presents for New Patient Visit (Endocrine consult. Referred by  for her diabetes.).  HPI  The patient is referred for evaluation of type 2 diabetes uncontrolled.    This is a 14-year-old female with had diabetes for approximately last 20 years.    She has renal insufficiency with a GFR of 54.    In the past she was on metformin which she did not tolerate because of GI side effects Jardiance caused dizziness and shortness of breath and she was also on glipizide which she thought caused GI side effects but it was at the same time she was on metformin.    She is currently taking Trulicity 4.5 mg.    She had a hemoglobin A1c last week that was 11.8%.    She checks blood sugars at least once daily and they are generally 300-400.    She has met with nutrition and has tightened up her diet.    She has not been exercising.    She has a past history of thyroidectomy in 1999 for benign disease on appropriate thyroid replacement hypertension hyperlipidemia hysterectomy embolic stroke at the age of 18.    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    Family history positive for diabetes in a cousin negative for thyroid.    ROS  Comprehensive review of systems is negative.    Objective   Physical Exam  Visit Vitals  /78      Vitals:    10/01/24 1347   Weight: 104 kg (230 lb)      Body mass index is 37.12 kg/m².      Alert and oriented x3  In no distress  No focal neurologic deficits  No supraclavicular or dorsal fat  No purple striae  Integument intact    ENT normal. No adenopathy  Fundi normal  Thyroid palpable and normal. No nodules  Chest clear to auscultation  Heart sounds are normal  Abdomen nontender. Bowel sounds normal. No organomegaly  Feet are okay  Normal vibration and monofilament sensation normal pulses, no lesions    Current Outpatient Medications   Medication Sig Dispense Refill     acetaminophen (Tylenol) 325 mg tablet Take 3 tablets (975 mg) by mouth every 6 hours if needed for moderate pain (4 - 6) or mild pain (1 - 3). 30 tablet 0    amLODIPine (Norvasc) 5 mg tablet TAKE 1 TABLET BY MOUTH DAILY 90 tablet 0    aspirin 81 mg EC tablet Take 1 tablet (81 mg) by mouth once daily.      atorvastatin (Lipitor) 40 mg tablet Take 1 tablet (40 mg) by mouth once daily. 90 tablet 2    blood sugar diagnostic (OneTouch Ultra Test) strip Test glucose 2-3 times a day. 200 strip 2    carvedilol (Coreg) 3.125 mg tablet TAKE 1 TABLET BY MOUTH TWICE  DAILY (MORNING AND LATE  AFTERNOON 180 tablet 1    dulaglutide (Trulicity) 4.5 mg/0.5 mL pen injector Inject 4.5 mg under the skin 1 (one) time per week. 12 Pen 0    fluticasone (Flonase) 50 mcg/actuation nasal spray Administer 2 sprays into each nostril once daily. 16 g 1    hydroCHLOROthiazide (HYDRODiuril) 25 mg tablet TAKE 1 TABLET BY MOUTH ONCE  DAILY 90 tablet 1    levothyroxine (Synthroid, Levoxyl) 125 mcg tablet TAKE 1 TABLET BY MOUTH ONCE  DAILY 90 tablet 0    loratadine 10 mg capsule Take 10 mg by mouth once daily.      omeprazole (PriLOSEC) 20 mg tablet,delayed release (DR/EC) EC tablet Take 1 tablet (20 mg) by mouth once daily.      cyclobenzaprine (Flexeril) 10 mg tablet Take 1 tablet (10 mg) by mouth 3 times a day as needed for muscle spasms. 30 tablet 0    SITagliptin phosphate (Januvia) 100 mg tablet Take 1 tablet (100 mg) by mouth once daily. (Patient not taking: Reported on 10/1/2024) 90 tablet 1     No current facility-administered medications for this visit.       Assessment/Plan     1.  Type 2 diabetes uncontrolled  2.  Hypertension  3.  Hyperlipidemia  4.  Hypothyroidism    We discussed the pathophysiology of diabetes, insulin resistance and insulin insufficiency. We discussed risks for complications, goals for treatment, as well as options for treatment.    We discussed her thyroid she will maintain her current dose.    Will switch Trulicity  to Mounjaro starting at 10 mg and working up from there if tolerated.    Will add in glimepiride 4 mg every morning and nightly.    Will consider Farxiga or pioglitazone down the road.    We did discuss that if we are unable to get control of her blood sugars would potentially need to add an insulin.    She will work on diet and exercise.    She will follow-up with me in 3 months sooner as needed.    I spent 60 minutes with this patient.  Greater than 50% of this time was spent in counseling and/or coordination of care.           I have personally seen and examined this patient.  I have fully participated in the care of this patient. I have reviewed all pertinent clinical information, including history, physical exam, plan and the Resident’s note and agree except as noted.